# Patient Record
Sex: MALE | Race: WHITE | NOT HISPANIC OR LATINO | Employment: OTHER | URBAN - METROPOLITAN AREA
[De-identification: names, ages, dates, MRNs, and addresses within clinical notes are randomized per-mention and may not be internally consistent; named-entity substitution may affect disease eponyms.]

---

## 2017-05-19 ENCOUNTER — HISTORICAL (OUTPATIENT)
Dept: LAB | Facility: HOSPITAL | Age: 62
End: 2017-05-19

## 2017-05-19 LAB
ALBUMIN SERPL-MCNC: 3.8 GM/DL (ref 3.4–5)
ALBUMIN/GLOB SERPL: 1 RATIO (ref 1.1–2)
ALP SERPL-CCNC: 66 UNIT/L (ref 46–116)
ALT SERPL-CCNC: 30 UNIT/L (ref 12–78)
APPEARANCE, UA: CLEAR
AST SERPL-CCNC: 14 UNIT/L (ref 15–37)
BACTERIA #/AREA URNS AUTO: NORMAL /HPF
BILIRUB SERPL-MCNC: 0.4 MG/DL (ref 0.2–1)
BILIRUB UR QL STRIP: NEGATIVE
BILIRUBIN DIRECT+TOT PNL SERPL-MCNC: 0.11 MG/DL (ref 0–0.2)
BILIRUBIN DIRECT+TOT PNL SERPL-MCNC: 0.29 MG/DL (ref 0–0.8)
BNP BLD-MCNC: 43 PG/ML (ref 0–125)
BUN SERPL-MCNC: 16 MG/DL (ref 7–18)
CALCIUM SERPL-MCNC: 9.4 MG/DL (ref 8.5–10.1)
CHLORIDE SERPL-SCNC: 103 MMOL/L (ref 98–107)
CHOLEST SERPL-MCNC: 180 MG/DL (ref 0–200)
CHOLEST/HDLC SERPL: 6 {RATIO} (ref 0–5)
CO2 SERPL-SCNC: 31.8 MMOL/L (ref 21–32)
COLOR UR: YELLOW
CREAT SERPL-MCNC: 1.05 MG/DL (ref 0.6–1.3)
ERYTHROCYTE [DISTWIDTH] IN BLOOD BY AUTOMATED COUNT: 14.4 % (ref 11.5–17)
GLOBULIN SER-MCNC: 3.7 GM/DL (ref 2.4–3.5)
GLUCOSE (UA): NEGATIVE
GLUCOSE SERPL-MCNC: 91 MG/DL (ref 74–106)
HCT VFR BLD AUTO: 50.1 % (ref 42–52)
HDLC SERPL-MCNC: 30 MG/DL (ref 40–60)
HGB BLD-MCNC: 15.9 GM/DL (ref 14–18)
HGB UR QL STRIP: NEGATIVE
KETONES UR QL STRIP: NEGATIVE
LDLC SERPL CALC-MCNC: 124 MG/DL (ref 0–129)
LEUKOCYTE ESTERASE UR QL STRIP: NEGATIVE
MCH RBC QN AUTO: 30.8 PG (ref 27–31)
MCHC RBC AUTO-ENTMCNC: 31.7 GM/DL (ref 33–36)
MCV RBC AUTO: 97.1 FL (ref 80–94)
NITRITE UR QL STRIP.AUTO: NEGATIVE
PH UR STRIP: 5.5 [PH] (ref 5–9)
PLATELET # BLD AUTO: 163 X10(3)/MCL (ref 130–400)
PMV BLD AUTO: 8.8 FL (ref 7.4–10.4)
POTASSIUM SERPL-SCNC: 5 MMOL/L (ref 3.5–5.1)
PROT SERPL-MCNC: 7.5 GM/DL (ref 6.4–8.2)
PROT UR QL STRIP: NEGATIVE
RBC # BLD AUTO: 5.16 X10(6)/MCL (ref 4.7–6.1)
RBC #/AREA URNS HPF: NORMAL /[HPF]
SODIUM SERPL-SCNC: 142 MMOL/L (ref 136–145)
SP GR UR STRIP: 1.02 (ref 1–1.03)
SQUAMOUS EPITHELIAL, UA: NORMAL
TRIGL SERPL-MCNC: 129 MG/DL
TSH SERPL-ACNC: 0.66 MIU/ML (ref 0.36–3.74)
UROBILINOGEN UR STRIP-ACNC: 0.2
VLDLC SERPL CALC-MCNC: 26 MG/DL
WBC # SPEC AUTO: 10.6 X10(3)/MCL (ref 4.5–11.5)
WBC #/AREA URNS AUTO: NORMAL /[HPF]

## 2018-01-04 ENCOUNTER — HOSPITAL ENCOUNTER (OUTPATIENT)
Dept: MEDSURG UNIT | Facility: HOSPITAL | Age: 63
End: 2018-01-05
Attending: INTERNAL MEDICINE | Admitting: INTERNAL MEDICINE

## 2018-10-05 ENCOUNTER — HISTORICAL (OUTPATIENT)
Dept: LAB | Facility: HOSPITAL | Age: 63
End: 2018-10-05

## 2018-10-05 LAB
ALBUMIN SERPL-MCNC: 3.7 GM/DL (ref 3.4–5)
ALBUMIN/GLOB SERPL: 1.2 RATIO (ref 1.1–2)
ALP SERPL-CCNC: 61 UNIT/L (ref 46–116)
ALT SERPL-CCNC: 29 UNIT/L (ref 12–78)
APPEARANCE, UA: CLEAR
AST SERPL-CCNC: 15 UNIT/L (ref 15–37)
BACTERIA #/AREA URNS AUTO: NORMAL /HPF
BILIRUB SERPL-MCNC: 0.2 MG/DL (ref 0.2–1)
BILIRUB UR QL STRIP: NEGATIVE
BILIRUBIN DIRECT+TOT PNL SERPL-MCNC: 0.09 MG/DL (ref 0–0.2)
BILIRUBIN DIRECT+TOT PNL SERPL-MCNC: 0.11 MG/DL (ref 0–0.8)
BUN SERPL-MCNC: 26.6 MG/DL (ref 7–18)
CALCIUM SERPL-MCNC: 9.2 MG/DL (ref 8.5–10.1)
CHLORIDE SERPL-SCNC: 101 MMOL/L (ref 98–107)
CHOLEST SERPL-MCNC: 171 MG/DL (ref 0–200)
CHOLEST/HDLC SERPL: 6.1 {RATIO} (ref 0–5)
CO2 SERPL-SCNC: 31.6 MMOL/L (ref 21–32)
COLOR UR: YELLOW
CREAT SERPL-MCNC: 1.3 MG/DL (ref 0.6–1.3)
ERYTHROCYTE [DISTWIDTH] IN BLOOD BY AUTOMATED COUNT: 13.6 % (ref 11.5–17)
GLOBULIN SER-MCNC: 3.2 GM/DL (ref 2.4–3.5)
GLUCOSE (UA): NEGATIVE
GLUCOSE SERPL-MCNC: 94 MG/DL (ref 74–106)
HCT VFR BLD AUTO: 46.9 % (ref 42–52)
HDLC SERPL-MCNC: 28 MG/DL (ref 40–60)
HGB BLD-MCNC: 14.8 GM/DL (ref 14–18)
HGB UR QL STRIP: NEGATIVE
KETONES UR QL STRIP: NEGATIVE
LDLC SERPL CALC-MCNC: 113 MG/DL (ref 0–129)
LEUKOCYTE ESTERASE UR QL STRIP: NEGATIVE
MCH RBC QN AUTO: 31.6 PG (ref 27–31)
MCHC RBC AUTO-ENTMCNC: 31.6 GM/DL (ref 33–36)
MCV RBC AUTO: 100.2 FL (ref 80–94)
NITRITE UR QL STRIP.AUTO: NEGATIVE
PH UR STRIP: 6 [PH] (ref 5–9)
PLATELET # BLD AUTO: 153 X10(3)/MCL (ref 130–400)
PMV BLD AUTO: 10.1 FL (ref 9.4–12.4)
POTASSIUM SERPL-SCNC: 5 MMOL/L (ref 3.5–5.1)
PROT SERPL-MCNC: 6.9 GM/DL (ref 6.4–8.2)
PROT UR QL STRIP: NEGATIVE
PSA SERPL-MCNC: 1.26 NG/ML (ref 0–4)
RBC # BLD AUTO: 4.68 X10(6)/MCL (ref 4.7–6.1)
RBC #/AREA URNS HPF: NORMAL /[HPF]
SODIUM SERPL-SCNC: 139 MMOL/L (ref 136–145)
SP GR UR STRIP: 1.01 (ref 1–1.03)
SQUAMOUS EPITHELIAL, UA: NORMAL
TRIGL SERPL-MCNC: 152 MG/DL
TSH SERPL-ACNC: 0.8 MIU/ML (ref 0.36–3.74)
UROBILINOGEN UR STRIP-ACNC: 0.2
VLDLC SERPL CALC-MCNC: 30 MG/DL
WBC # SPEC AUTO: 9.5 X10(3)/MCL (ref 4.5–11.5)
WBC #/AREA URNS AUTO: NORMAL /[HPF]

## 2021-01-04 ENCOUNTER — HISTORICAL (OUTPATIENT)
Dept: LAB | Facility: HOSPITAL | Age: 66
End: 2021-01-04

## 2021-01-04 LAB
ALBUMIN SERPL-MCNC: 3.5 GM/DL (ref 3.4–4.8)
ALBUMIN/GLOB SERPL: 1.2 RATIO (ref 1.1–2)
ALP SERPL-CCNC: 57 UNIT/L (ref 40–150)
ALT SERPL-CCNC: 24 UNIT/L (ref 0–55)
APPEARANCE, UA: CLEAR
AST SERPL-CCNC: 15 UNIT/L (ref 5–34)
BACTERIA #/AREA URNS AUTO: NORMAL /HPF
BILIRUB SERPL-MCNC: 0.4 MG/DL
BILIRUB UR QL STRIP: NEGATIVE
BILIRUBIN DIRECT+TOT PNL SERPL-MCNC: 0.1 MG/DL (ref 0–0.5)
BILIRUBIN DIRECT+TOT PNL SERPL-MCNC: 0.3 MG/DL (ref 0–0.8)
BUN SERPL-MCNC: 16.8 MG/DL (ref 8.4–25.7)
CALCIUM SERPL-MCNC: 8.8 MG/DL (ref 8.8–10)
CHLORIDE SERPL-SCNC: 100 MMOL/L (ref 98–107)
CHOLEST SERPL-MCNC: 170 MG/DL
CHOLEST/HDLC SERPL: 5 {RATIO} (ref 0–5)
CO2 SERPL-SCNC: 32 MMOL/L (ref 23–31)
COLOR UR: YELLOW
CREAT SERPL-MCNC: 0.91 MG/DL (ref 0.73–1.18)
ERYTHROCYTE [DISTWIDTH] IN BLOOD BY AUTOMATED COUNT: 14 % (ref 11.5–17)
GLOBULIN SER-MCNC: 2.9 GM/DL (ref 2.4–3.5)
GLUCOSE (UA): NEGATIVE
GLUCOSE SERPL-MCNC: 91 MG/DL (ref 82–115)
HCT VFR BLD AUTO: 50.4 % (ref 42–52)
HDLC SERPL-MCNC: 36 MG/DL (ref 35–60)
HGB BLD-MCNC: 15 GM/DL (ref 14–18)
HGB UR QL STRIP: NORMAL
KETONES UR QL STRIP: NEGATIVE
LDLC SERPL CALC-MCNC: 115 MG/DL (ref 50–140)
LEUKOCYTE ESTERASE UR QL STRIP: NEGATIVE
MCH RBC QN AUTO: 31 PG (ref 27–31)
MCHC RBC AUTO-ENTMCNC: 29.8 GM/DL (ref 33–36)
MCV RBC AUTO: 104.1 FL (ref 80–94)
NITRITE UR QL STRIP.AUTO: NEGATIVE
PH UR STRIP: 5 [PH] (ref 5–9)
PLATELET # BLD AUTO: 162 X10(3)/MCL (ref 130–400)
PMV BLD AUTO: 10.2 FL (ref 9.4–12.4)
POTASSIUM SERPL-SCNC: 4.2 MMOL/L (ref 3.5–5.1)
PROT SERPL-MCNC: 6.4 GM/DL (ref 5.8–7.6)
PROT UR QL STRIP: NEGATIVE
RBC # BLD AUTO: 4.84 X10(6)/MCL (ref 4.7–6.1)
RBC #/AREA URNS HPF: NORMAL /HPF
SODIUM SERPL-SCNC: 140 MMOL/L (ref 136–145)
SP GR UR STRIP: 1.01 (ref 1–1.03)
SQUAMOUS EPITHELIAL, UA: NORMAL
TRIGL SERPL-MCNC: 94 MG/DL (ref 34–140)
TSH SERPL-ACNC: 0.83 UIU/ML (ref 0.35–4.94)
UROBILINOGEN UR STRIP-ACNC: 0.2
VLDLC SERPL CALC-MCNC: 19 MG/DL
WBC # SPEC AUTO: 8.4 X10(3)/MCL (ref 4.5–11.5)
WBC #/AREA URNS AUTO: NORMAL /[HPF]

## 2022-04-30 NOTE — H&P
Patient:   Justin Fierro            MRN: [id number]            FIN: [id number]               Age:   62 years     Sex:  Male     :  1955   Associated Diagnoses:   None   Author:   Jeff Mota MD      Chief Complaint   2018 8:00 CST        coughing, congested, x 3 days, fever, chills, body aches, hx copd, nebulizer 2 hours ago, inhaler 2 hours ago        History of Present Illness   The patient presents with severe copd by PFT GOLD 3 who presented wth cough, congestion and fever with acute on chronic resp failure worsened by influenza;  pt was tachypneic and hypoxemic; symptoms were mod to severe. pt better with steroids; tamflu started CXR shows no infiltrates.  comoribed factors include CAD and CHF with unkonw EF but presumed to be less than 40% due to recs of needing defibrillator per Dr Cobos; he continues to smoke despite the issues and still wants to be a DNR.        Review of Systems   Constitutional:  Fever, Chills, Sweats, Weakness.    Eye:  No blurring, No double vision.    Ear/Nose/Mouth/Throat:  Nasal congestion, Sore throat.    Respiratory:  Shortness of breath, Cough, Sputum production, Wheezing, No hemoptysis, No cyanosis.    Cardiovascular:  Tachycardia, No chest pain, No palpitations, No bradycardia, No peripheral edema.    Gastrointestinal:  Vomiting, No nausea.    Genitourinary:  No dysuria, No hematuria.    Hematology/Lymphatics:  No bruising tendency, No bleeding tendency.    Endocrine:  No excessive thirst, No polyuria.    Immunologic:  Not immunocompromised, No recurrent fevers.    Musculoskeletal:  No neck pain, No joint pain.    Integumentary:  No rash, No pruritus.    Neurologic:  Alert and oriented X4, No abnormal balance.    Psychiatric:  No anxiety, No depression.    All other systems are negative      Health Status   Current medications:  (Selected)   Inpatient Medications  Ordered  Altace 2.5 mg oral capsule: 10 mg, form: Cap, Oral, BID, first dose 18 21:00:00  CST, 03316  Coreg 12.5 mg oral tablet: 12.5 mg, form: Tab, Oral, qPM, first dose 01/04/18 21:00:00 CST, 44827  Coreg 12.5 mg oral tablet: 25 mg, form: Tab, Oral, Daily, first dose 01/05/18 9:00:00 CST, 23  DuoNeb: 3 mL, form: Soln, NEB, q4hr Resp, first dose 01/04/18 14:00:00 CST  SOLU-Medrol: 80 mg, form: Injection, IV Push, q6hr, first dose 01/04/18 11:00:00 CST, 30,022  Tamiflu 75 mg oral capsule: 75 mg, form: Cap, Oral, BID, Order duration: 5 day(s), first dose 01/04/18 21:00:00 CST, stop date 01/09/18 20:59:00 CST, 24,034  Wellbutrin XL: 150 mg, form: Tab XL, Oral, Daily, first dose 01/05/18 9:00:00 CST, 23  alPRAzolam 0.5 mg oral tablet: 0.5 mg, form: Tab, Oral, Daily PRN for anxiety, first dose 01/04/18 9:51:00 CST, 23  escitalopram 10 mg oral tablet: 10 mg, form: Tab, Oral, Daily, first dose 01/05/18 9:00:00 CST, 23  furosemide 40 mg oral tablet: 40 mg, form: Tab, Oral, BID, first dose 01/04/18 18:00:00 CST, 24,037  tamsulosin 0.4 mg oral capsule: 0.4 mg, form: Cap, Oral, Daily, first dose 01/05/18 9:00:00 CST, 23  Prescriptions  Prescribed  ProAir HFA 90 mcg/inh inhalation aerosol with adapter: 2 puff(s), INH, q4hr, PRN PRN for wheezing, use with spacer chamber, # 1 EA, 0 Refill(s)  Documented Medications  Documented  TAMSULOSIN 0.4MG CAPSULES: 0.4 mg = 1 cap(s), Oral, Daily  alPRAzolam 0.5 mg oral tablet: 0.5 mg = 1 tab(s), Oral, Daily, PRN for anxiety  aspirin 81 mg oral tablet, CHEWABLE: 1 tab, Oral, Daily  buPROPion 150 mg oral tablet, extended release: 1 tab, Oral, Daily  carvedilol 25 mg oral tablet: See Instructions, 1 tab q am and 1/2 tab q pm  escitalopram 10 mg oral tablet: 1 tab, Oral, Daily  furosemide 40 mg oral tablet: 1 tab, Oral, BID  ramipril 10 mg oral tablet: 1 tab, Oral, BID      Histories   Past Medical History:    Resolved  Venous stasis (176708336):  Resolved.  PUD - Peptic ulcer disease (9868923508):  Resolved.  ARTURO - Obstructive sleep apnea (7987344410):  Resolved.  COPD - Chronic  obstructive pulmonary disease (128026156):  Resolved.  Continuous tobacco use (0447089937):  Resolved.  CHF - Congestive heart failure (273722756):  Resolved.  CAD - Coronary artery disease (4983076245):  Resolved.  BPH - Benign prostatic hypertrophy (142966796):  Resolved.  BP+ - Hypertension (691073651):  Resolved.  Adult-onset obesity (197596021):  Resolved.   Family History:    Primary malignant neoplasm of colon  Other  Comments:  1/4/2018 14:58 - Svetlana DUNBAR, Jeff  Uncle  Primary malignant neoplasm of breast  Mother  Primary malignant neoplasm of prostate  Brother     Procedure history:    removal of skin lession to right hand.  Appendectomy (SNOMED CT 758515902).  T&A.  baloon LAD.   Social History        Social & Psychosocial Habits    Alcohol  07/01/2016  Use: Never    Substance Abuse  07/01/2016  Use: Never    Tobacco  07/01/2016  Use: Current every day smoker    Type: Cigarettes    Tobacco use per day: 05    Ready to change: No    Concerns about tobacco use in household: No    01/04/2018  Use: Current every day smoker    Type: Cigarettes    Tobacco use per day: 20  .        Physical Examination   General:  Alert and oriented, Mild distress.    Eye:  Pupils are equal, round and reactive to light, Extraocular movements are intact.    HENT:  Normocephalic, Normal hearing.    Neck:  Supple, Non-tender.    Respiratory:  decreased breath sounds bilaterally with wheezing.    Cardiovascular:  Normal rate, Regular rhythm, No murmur.    Gastrointestinal:  Soft, Non-tender, Non-distended.       Vital Signs (last 24 hrs)_____  Last Charted___________  Temp Oral     37.2 DegC  (JAN 04 11:46)  Heart Rate Peripheral   80 bpm  (JAN 04 11:46)  Resp Rate         22 br/min  (JAN 04 11:00)  SBP      H 146mmHg  (JAN 04 11:46)  DBP      75 mmHg  (JAN 04 11:46)  SpO2      L 93%  (JAN 04 11:46)  Weight      142.7 kg  (JAN 04 11:46)  Height      182.8 cm  (JAN 04 11:46)  BMI      42.7  (JAN 04 11:46)     Genitourinary:  No  costovertebral angle tenderness.    Lymphatics:  No lymphadenopathy neck, axilla, groin.    Musculoskeletal:  Normal range of motion, Normal strength.    Integumentary:  Warm, Dry, bilateral venous stasis changes.       Review / Management   Results review:     No qualifying data available.    Laboratory Results   Last 5 Days Lab Results : PowerNote Discrete Results   1/4/2018 8:19 CST        WBC                       6.7 x10(3)/mcL                             RBC                       4.78 x10(6)/mcL                             Hgb                       15.1 gm/dL                             Hct                       46.6 %                             Platelet                  113 x10(3)/mcL  LOW                             MCV                       97.6 fL  HI                             MCH                       31.5 pg  HI                             MCHC                      32.3 gm/dL  LOW                             RDW                       15.2 %                             MPV                       8.3 fL                             Abs Neut                  4.40 x10(3)/mcL                             Neutro Auto               66 %                             Lymph Auto                19 %                             Mono Auto                 14 %  HI                             Eos Auto                  0 %  NA                             Basophil Auto             1 %                             Abs Neutro                4.40 x10(3)/mcL                             Abs Lymph                 1.2 x10(3)/mcL  NA                             Abs Mono                  0.9 x10(3)/mcL  NA                             Abs Baso                  0.1 x10(3)/mcL  NA                             Sodium Lvl                136 mmol/L                             Potassium Lvl             4.7 mmol/L                             Chloride                  100 mmol/L                             CO2                       33.6  mmol/L  HI                             Calcium Lvl               9.1 mg/dL                             Glucose Lvl               102 mg/dL                             BUN                       18.1 mg/dL  HI                             Creatinine                1.13 mg/dL                             eGFR-AA                   >60 mL/min/1.73 m2  NA                             eGFR-EMERSON                  >60 mL/min/1.73 m2  NA                             Bili Total                0.4 mg/dL                             Bili Direct               0.13 mg/dL                             Bili Indirect             0.30 mg/dL                             AST                       30 unit/L                             ALT                       42 unit/L                             Alk Phos                  60 unit/L                             Total Protein             7.6 gm/dL                             Albumin Lvl               3.50 gm/dL                             Globulin                  4.10 gm/dL  HI                             A/G Ratio                 0.9 ratio  LOW                             BNP                       65.0 pg/mL        Diagnostic Findings:  * Final Report *    Reason For Exam  Congestion    Radiology Report  PORTABLE CHEST X-RAY, ONE FRONTAL VIEW     HISTORY: Congestion     COMPARISON: 3/30/2017     FINDINGS:       No focal consolidations, pleural effusions or pneumothoraces.  Cardiomediastinal silhouette within normal limits.  No acute bony pathology.  Soft tissues within normal limits.       IMPRESSION:     No acute thoracic abnormality.  No significant interval change.       Signature Line  Electronically Signed By: Alex Machado MD  Date/Time Signed: 01/04/2018 08:30      This document has an image    Result type: XR Chest 1 View  Result date: January 04, 2018 8:20 CST  Result status: Auth (Verified)  Result title: XR Chest 1 View  Performed by: Alex Machado MD on January 04, 2018  8:28 CST  Verified by: Alex Machado MD on January 04, 2018 8:30 CST  Encounter info: [id number], [facility] Acute, Emergency, 1/4/2018 - 1/4/2018      .       Impression and Plan   COPD exacerbation with acute on chronic resp failure along with hypoxemia secondary to Inluenza; start tamiflu, steroids and nebs; give patch for tobacco abuse  thromboyctopenia; clinically looks good; no signs of sepsis; hold aspirin  CAD: pt at high risk and is full code; hx LAD sp balloon around 2000; otain records from dr rondon; continues to smoke; denies HLD or DM; restart asa if platelets improve  BPD: cont meds   HTN: Stable cont meds  CHF unknow EF; cont acei, diuretic, bblocker; ordered workup from dr rondon for review; appears stable volume status  DCPlanning in 1-2 days

## 2022-04-30 NOTE — DISCHARGE SUMMARY
Patient:   Justin Fierro            MRN: [id number]            FIN: [id number]               Age:   62 years     Sex:  Male     :  1955   Associated Diagnoses:   None   Author:   Svetlana DUNBAR, Jeff      Chief Complaint   2018 8:00 CST        coughing, congested, x 3 days, fever, chills, body aches, hx copd, nebulizer 2 hours ago, inhaler 2 hours ago        History of Present Illness   The patient presents with severe copd by PFT GOLD 3 who presented wth cough, congestion and fever with acute on chronic resp failure worsened by influenza;  pt was tachypneic and hypoxemic; symptoms were mod to severe. pt better with steroids; tamflu started CXR shows no infiltrates.  comoribed factors include CAD and CHF with unkonw EF but presumed to be less than 40% due to recs of needing defibrillator per Dr Cobos; he continues to smoke despite the issues and still wants to be a DNR.       CC: SOB: much better today; wants to go home. I discussed DC options for this afternoon; He has oxygen and nebulizer at home.  He has been walking without oxygen around hospital; cough improved      Health Status   Current medications:  (Selected)   Inpatient Medications  Ordered  Altace 2.5 mg oral capsule: 10 mg, form: Cap, Oral, BID, first dose 18 21:00:00 CST, 57637  Coreg 12.5 mg oral tablet: 12.5 mg, form: Tab, Oral, qPM, first dose 18 21:00:00 CST, 36935  Coreg 12.5 mg oral tablet: 25 mg, form: Tab, Oral, Daily, first dose 18 9:00:00 CST, 23  DuoNeb: 3 mL, form: Soln, NEB, q4hr Resp, first dose 18 14:00:00 CST  Tamiflu 75 mg oral capsule: 75 mg, form: Cap, Oral, BID, Order duration: 5 day(s), first dose 18 21:00:00 CST, stop date 18 20:59:00 CST, 24,034  Wellbutrin XL: 150 mg, form: Tab XL, Oral, Daily, first dose 18 9:00:00 CST, 23  alPRAzolam 0.5 mg oral tablet: 0.5 mg, form: Tab, Oral, Daily PRN for anxiety, first dose 18 9:51:00 CST, 23  escitalopram 10 mg oral tablet:  10 mg, form: Tab, Oral, Daily, first dose 01/05/18 9:00:00 CST, 23  furosemide 40 mg oral tablet: 40 mg, form: Tab, Oral, BID, first dose 01/04/18 18:00:00 CST, 24,037  prednisONE 10 mg oral tablet: 30 mg, form: Tab, Oral, BID, first dose 01/05/18 11:00:00 CST, 24,034  tamsulosin 0.4 mg oral capsule: 0.4 mg, form: Cap, Oral, Daily, first dose 01/05/18 9:00:00 CST, 23  Prescriptions  Prescribed  DuoNeb 0.5 mg-2.5 mg/3 mL inhalation solution: 3 mL, NEB, q4hr Resp, # 180 mL, 1 Refill(s), Pharmacy: [facility] [id number]  ProAir HFA 90 mcg/inh inhalation aerosol with adapter: 2 puff(s), INH, q4hr, PRN PRN for wheezing, use with spacer chamber, # 1 EA, 0 Refill(s)  Tamiflu 75 mg oral capsule: 75 mg = 1 cap(s), Oral, BID, X 9 day(s), # 18 cap(s), 0 Refill(s), Pharmacy: [facility] [id number]  prednisONE 10 mg oral tablet: See Instructions, 3 tab(s) Oral BIDX 2 days  2 tabs oral BID X 2 days then  1 tab oral BID X 2 days then  1 tab oral daily X 2 days then DC, # 26 tab(s), 0 Refill(s), Pharmacy: [facility] [id number]  Documented Medications  Documented  TAMSULOSIN 0.4MG CAPSULES: 0.4 mg = 1 cap(s), Oral, Daily  alPRAzolam 0.5 mg oral tablet: 0.5 mg = 1 tab(s), Oral, Daily, PRN PRN anxiety, 0 Refill(s)  aspirin 81 mg oral tablet, CHEWABLE: 1 tab, Oral, Daily  buPROPion 150 mg/24 hours (XL) oral tablet, extended release: 150 mg = 1 tab(s), Oral, Daily, 0 Refill(s)  carvedilol 25 mg oral tablet: See Instructions, 1 tab q am and 1/2 tab q pm  escitalopram 10 mg oral tablet: 1 tab, Oral, Daily  furosemide 40 mg oral tablet: 1 tab, Oral, BID  ramipril 10 mg oral tablet: 1 tab, Oral, BID      Physical Examination   General:  Alert and oriented, Mild distress.    Eye:  Pupils are equal, round and reactive to light, Extraocular movements are intact.    HENT:  Normocephalic, Normal hearing.    Neck:  Supple, Non-tender.    Respiratory:  decreased breath sounds bilaterally with rare wheezing.    Cardiovascular:  Normal  rate, Regular rhythm, No murmur.    Gastrointestinal:  Soft, Non-tender, Non-distended.       Vital Signs (last 24 hrs)_____  Last Charted___________  Temp Oral     36.5 DegC  (JAN 05 12:50)  Heart Rate Peripheral   72 bpm  (JAN 05 12:50)  Resp Rate         18 br/min  (JAN 05 11:00)  SBP      95 mmHg  (JAN 05 12:50)  DBP      64 mmHg  (JAN 05 12:50)  SpO2      L 85%  (JAN 05 12:50)  Weight      143.6 kg  (JAN 05 07:00)     Genitourinary:  No costovertebral angle tenderness.    Lymphatics:  No lymphadenopathy neck, axilla, groin.    Musculoskeletal:  Normal range of motion, Normal strength.    Integumentary:  Warm, Dry, bilateral venous stasis changes.       Review / Management   Results review:     No qualifying data available.       Impression and Plan   COPD exacerbation with acute on chronic resp failure along with hypoxemia secondary to Inluenza; start tamiflu, steroids and nebs; give patch for tobacco abuse; 1/5: gave info on smoking cessation; clearly not ready to quit despite discussion. I transitioned him to oral steroids; I would have liked him to stay another day on IV so that I could taper down these but he refuses to stay any further.  He agreed to stay till this afternoon.  I will reeval him this afternoon for DC  Thromboyctopenia; clinically looks good; no signs of sepsis; hold aspirin and repeat labs as outpt if DC and may restart with obsevation if improved. Otherwise defer to PCP for further workup.   CAD: pt at high risk and is full code; hx LAD sp balloon around 2000; otain records from dr rondon; continues to smoke; denies HLD or DM; restart asa if platelets improve  BPH: cont meds   HTN: Stable cont meds  CHF unknow EF; cont acei, diuretic, bblocker; ordered workup from dr rondon for review; appears stable volume status  DCPlanning this afternoon  time spent in in DC process 38 minutes

## 2022-12-05 ENCOUNTER — LAB VISIT (OUTPATIENT)
Dept: LAB | Facility: HOSPITAL | Age: 67
End: 2022-12-05
Attending: FAMILY MEDICINE
Payer: COMMERCIAL

## 2022-12-05 DIAGNOSIS — I10 ESSENTIAL HYPERTENSION, MALIGNANT: ICD-10-CM

## 2022-12-05 DIAGNOSIS — F41.9 ANXIETY: ICD-10-CM

## 2022-12-05 DIAGNOSIS — E78.5 HYPERLIPIDEMIA, UNSPECIFIED HYPERLIPIDEMIA TYPE: ICD-10-CM

## 2022-12-05 DIAGNOSIS — I25.10 CORONARY ATHEROSCLEROSIS OF NATIVE CORONARY ARTERY: ICD-10-CM

## 2022-12-05 DIAGNOSIS — R07.9 CHEST PAIN, UNSPECIFIED TYPE: ICD-10-CM

## 2022-12-05 DIAGNOSIS — N40.0 BENIGN PROSTATIC HYPERPLASIA, UNSPECIFIED WHETHER LOWER URINARY TRACT SYMPTOMS PRESENT: ICD-10-CM

## 2022-12-05 DIAGNOSIS — J44.89 OBSTRUCTIVE CHRONIC BRONCHITIS WITHOUT EXACERBATION: ICD-10-CM

## 2022-12-05 DIAGNOSIS — L98.9 FIBROHISTIOCYTIC PROLIFERATION OF THE SKIN: ICD-10-CM

## 2022-12-05 DIAGNOSIS — G47.30 INSOMNIA WITH SLEEP APNEA: ICD-10-CM

## 2022-12-05 DIAGNOSIS — D69.6 THROMBOCYTOPENIA, UNSPECIFIED: ICD-10-CM

## 2022-12-05 DIAGNOSIS — R35.0 URINARY FREQUENCY: ICD-10-CM

## 2022-12-05 DIAGNOSIS — I50.9 HEART FAILURE, UNSPECIFIED HF CHRONICITY, UNSPECIFIED HEART FAILURE TYPE: ICD-10-CM

## 2022-12-05 DIAGNOSIS — G47.00 INSOMNIA WITH SLEEP APNEA: ICD-10-CM

## 2022-12-05 DIAGNOSIS — J30.9 SPASMODIC RHINORRHEA: Primary | ICD-10-CM

## 2022-12-05 DIAGNOSIS — M54.50 LUMBAGO: ICD-10-CM

## 2022-12-05 DIAGNOSIS — Z00.00 ROUTINE GENERAL MEDICAL EXAMINATION AT A HEALTH CARE FACILITY: ICD-10-CM

## 2022-12-05 LAB
ALBUMIN SERPL-MCNC: 3.4 GM/DL (ref 3.4–4.8)
ALBUMIN/GLOB SERPL: 0.9 RATIO (ref 1.1–2)
ALP SERPL-CCNC: 60 UNIT/L (ref 40–150)
ALT SERPL-CCNC: 16 UNIT/L (ref 0–55)
APPEARANCE UR: ABNORMAL
AST SERPL-CCNC: 14 UNIT/L (ref 5–34)
BACTERIA #/AREA URNS AUTO: ABNORMAL /HPF
BASOPHILS # BLD AUTO: 0.04 X10(3)/MCL (ref 0–0.2)
BASOPHILS NFR BLD AUTO: 0.4 %
BILIRUB UR QL STRIP.AUTO: NEGATIVE MG/DL
BILIRUBIN DIRECT+TOT PNL SERPL-MCNC: 0.7 MG/DL
BUN SERPL-MCNC: 14.9 MG/DL (ref 8.4–25.7)
CALCIUM SERPL-MCNC: 9.6 MG/DL (ref 8.8–10)
CHLORIDE SERPL-SCNC: 100 MMOL/L (ref 98–107)
CHOLEST SERPL-MCNC: 158 MG/DL
CHOLEST/HDLC SERPL: 5 {RATIO} (ref 0–5)
CO2 SERPL-SCNC: 30 MMOL/L (ref 23–31)
COLOR UR AUTO: ABNORMAL
CREAT SERPL-MCNC: 0.94 MG/DL (ref 0.73–1.18)
EOSINOPHIL # BLD AUTO: 0.28 X10(3)/MCL (ref 0–0.9)
EOSINOPHIL NFR BLD AUTO: 2.7 %
ERYTHROCYTE [DISTWIDTH] IN BLOOD BY AUTOMATED COUNT: 14.2 % (ref 11.5–17)
GFR SERPLBLD CREATININE-BSD FMLA CKD-EPI: >60 MLS/MIN/1.73/M2
GLOBULIN SER-MCNC: 4 GM/DL (ref 2.4–3.5)
GLUCOSE SERPL-MCNC: 92 MG/DL (ref 82–115)
GLUCOSE UR QL STRIP.AUTO: NEGATIVE MG/DL
HCT VFR BLD AUTO: 48.1 % (ref 42–52)
HDLC SERPL-MCNC: 31 MG/DL (ref 35–60)
HGB BLD-MCNC: 14.6 GM/DL (ref 14–18)
IMM GRANULOCYTES # BLD AUTO: 0.02 X10(3)/MCL (ref 0–0.04)
IMM GRANULOCYTES NFR BLD AUTO: 0.2 %
KETONES UR QL STRIP.AUTO: NEGATIVE MG/DL
LDLC SERPL CALC-MCNC: 110 MG/DL (ref 50–140)
LEUKOCYTE ESTERASE UR QL STRIP.AUTO: NEGATIVE UNIT/L
LYMPHOCYTES # BLD AUTO: 2.32 X10(3)/MCL (ref 0.6–4.6)
LYMPHOCYTES NFR BLD AUTO: 22.7 %
MCH RBC QN AUTO: 29.8 PG (ref 27–31)
MCHC RBC AUTO-ENTMCNC: 30.4 MG/DL (ref 33–36)
MCV RBC AUTO: 98.2 FL (ref 80–94)
MONOCYTES # BLD AUTO: 0.68 X10(3)/MCL (ref 0.1–1.3)
MONOCYTES NFR BLD AUTO: 6.7 %
NEUTROPHILS # BLD AUTO: 6.9 X10(3)/MCL (ref 2.1–9.2)
NEUTROPHILS NFR BLD AUTO: 67.3 %
NITRITE UR QL STRIP.AUTO: NEGATIVE
PH UR STRIP.AUTO: 6.5 [PH]
PLATELET # BLD AUTO: 154 X10(3)/MCL (ref 130–400)
PMV BLD AUTO: 9.9 FL (ref 7.4–10.4)
POTASSIUM SERPL-SCNC: 5.1 MMOL/L (ref 3.5–5.1)
PROT SERPL-MCNC: 7.4 GM/DL (ref 5.8–7.6)
PROT UR QL STRIP.AUTO: >=300 MG/DL
PSA SERPL-MCNC: 1.01 NG/ML
RBC # BLD AUTO: 4.9 X10(6)/MCL (ref 4.7–6.1)
RBC #/AREA URNS AUTO: ABNORMAL /HPF
RBC UR QL AUTO: NEGATIVE UNIT/L
SODIUM SERPL-SCNC: 138 MMOL/L (ref 136–145)
SP GR UR STRIP.AUTO: 1.02
SQUAMOUS #/AREA URNS AUTO: ABNORMAL /HPF
TRIGL SERPL-MCNC: 86 MG/DL (ref 34–140)
UROBILINOGEN UR STRIP-ACNC: 0.2 MG/DL
VLDLC SERPL CALC-MCNC: 17 MG/DL
WBC # SPEC AUTO: 10.2 X10(3)/MCL (ref 4.5–11.5)
WBC #/AREA URNS AUTO: ABNORMAL /HPF

## 2022-12-05 PROCEDURE — 36415 COLL VENOUS BLD VENIPUNCTURE: CPT

## 2022-12-05 PROCEDURE — 84153 ASSAY OF PSA TOTAL: CPT

## 2022-12-05 PROCEDURE — 80053 COMPREHEN METABOLIC PANEL: CPT

## 2022-12-05 PROCEDURE — 85025 COMPLETE CBC W/AUTO DIFF WBC: CPT

## 2022-12-05 PROCEDURE — 81003 URINALYSIS AUTO W/O SCOPE: CPT

## 2022-12-05 PROCEDURE — 80061 LIPID PANEL: CPT

## 2022-12-05 PROCEDURE — 81001 URINALYSIS AUTO W/SCOPE: CPT

## 2022-12-05 PROCEDURE — 84443 ASSAY THYROID STIM HORMONE: CPT

## 2022-12-08 LAB — TSH SERPL-ACNC: 1.26 UIU/ML (ref 0.35–4.94)

## 2023-01-04 ENCOUNTER — HOSPITAL ENCOUNTER (EMERGENCY)
Facility: HOSPITAL | Age: 68
Discharge: HOME | End: 2023-01-04
Attending: STUDENT IN AN ORGANIZED HEALTH CARE EDUCATION/TRAINING PROGRAM
Payer: MEDICARE

## 2023-01-04 VITALS
SYSTOLIC BLOOD PRESSURE: 120 MMHG | HEART RATE: 77 BPM | DIASTOLIC BLOOD PRESSURE: 66 MMHG | RESPIRATION RATE: 20 BRPM | OXYGEN SATURATION: 90 % | WEIGHT: 280 LBS | TEMPERATURE: 98 F | HEIGHT: 72 IN | BODY MASS INDEX: 37.93 KG/M2

## 2023-01-04 DIAGNOSIS — R06.02 SOB (SHORTNESS OF BREATH): ICD-10-CM

## 2023-01-04 DIAGNOSIS — L72.3 SEBACEOUS CYST: ICD-10-CM

## 2023-01-04 DIAGNOSIS — L03.313 CELLULITIS OF CHEST WALL: ICD-10-CM

## 2023-01-04 DIAGNOSIS — T14.8XXA SKIN ABRASION: Primary | ICD-10-CM

## 2023-01-04 PROCEDURE — 99283 EMERGENCY DEPT VISIT LOW MDM: CPT

## 2023-01-04 PROCEDURE — 25000003 PHARM REV CODE 250: Performed by: STUDENT IN AN ORGANIZED HEALTH CARE EDUCATION/TRAINING PROGRAM

## 2023-01-04 PROCEDURE — 93010 ELECTROCARDIOGRAM REPORT: CPT | Mod: ,,, | Performed by: INTERNAL MEDICINE

## 2023-01-04 PROCEDURE — 93005 ELECTROCARDIOGRAM TRACING: CPT

## 2023-01-04 PROCEDURE — 93010 EKG 12-LEAD: ICD-10-PCS | Mod: ,,, | Performed by: INTERNAL MEDICINE

## 2023-01-04 RX ORDER — DOXYCYCLINE 100 MG/1
100 CAPSULE ORAL
Status: COMPLETED | OUTPATIENT
Start: 2023-01-04 | End: 2023-01-04

## 2023-01-04 RX ORDER — DOXYCYCLINE 100 MG/1
100 CAPSULE ORAL 2 TIMES DAILY
Qty: 20 CAPSULE | Refills: 0 | Status: SHIPPED | OUTPATIENT
Start: 2023-01-04 | End: 2023-01-14

## 2023-01-04 RX ADMIN — DOXYCYCLINE HYCLATE 100 MG: 100 CAPSULE ORAL at 08:01

## 2023-01-04 NOTE — ED PROVIDER NOTES
"Encounter Date: 1/4/2023       History     Chief Complaint   Patient presents with    scrotum bleeding     Noted bleeding to scrotum last night and again this am/ [facility] states bleeding controlled/upon walking to [facility] truck he started with wheezing and sob/ they gave a duoneb and pt has no complaints now/ denies sob     Patient is 67-year-old white gentleman past medical of COPD not, hypertension presented to the ER via EMS due to scrotal bleeding.  Patient states he scratched his scrotum last night due to pruritus and states that it started bleeding at that time.  Patient is on anticoagulation.  Patient states the bleeding stops but he just wanted to have it "looked at. "Patient states minimal blood loss.  Patient quantifies blood loss as "1 tbsp. "Patient denies any fevers, chills, cough, congestion, chest pain, shortness of breath abdominal pain.  Under review of systems patient did state that he has a cyst was mid sternum.  Patient states was excised many years ago but now it is slightly tender to palpation.  Patient states he tried to "pop it" last night some "white sticky stuff came out. " patient denies any other complaints this time.    Review of patient's allergies indicates:   Allergen Reactions    Codeine Rash    Penicillins Nausea And Vomiting     No past medical history on file.  No past surgical history on file.  No family history on file.     Review of Systems   Constitutional:  Negative for chills, fatigue and fever.   HENT:  Negative for congestion, sore throat and trouble swallowing.    Eyes:  Negative for pain and visual disturbance.   Respiratory:  Negative for cough, shortness of breath and wheezing.    Cardiovascular:  Negative for chest pain and palpitations.   Gastrointestinal:  Negative for abdominal pain, blood in stool, constipation, diarrhea, nausea and vomiting.   Genitourinary:  Negative for dysuria and hematuria.   Musculoskeletal:  Negative for back pain and myalgias.   Skin:  Positive " for wound. Negative for rash.   Neurological:  Negative for seizures, syncope and headaches.   Psychiatric/Behavioral:  Negative for confusion. The patient is not nervous/anxious.      Physical Exam     Initial Vitals [01/04/23 0712]   BP Pulse Resp Temp SpO2   120/66 77 20 97.7 °F (36.5 °C) (!) 90 %      MAP       --         Physical Exam    Nursing note and vitals reviewed.  Constitutional: He appears well-developed and well-nourished. No distress.   HENT:   Head: Normocephalic and atraumatic.   Eyes: Conjunctivae and EOM are normal. Right eye exhibits no discharge. Left eye exhibits no discharge. No scleral icterus.   Neck: No tracheal deviation present.   Normal range of motion.  Cardiovascular:  Normal rate, regular rhythm and normal heart sounds.     Exam reveals no gallop and no friction rub.       No murmur heard.  Pulmonary/Chest: Breath sounds normal. No respiratory distress. He has no wheezes. He has no rhonchi. He has no rales.   Abdominal: Abdomen is soft. He exhibits no distension. There is no abdominal tenderness. There is no guarding.   Genitourinary:    Genitourinary Comments: Scrotum: RN present in the room for the entirety of this evaluation.  There is a superficial abrasion to the inferior border of the scrotum.  No active bleeding on exam.  Dermis is intact.  Findings consistent with a scratch kavya.    Chest wall:  There is a 2 cm area of erythema, induration.  Direct pressure applied and white, curdy discharge, malodorous was expelled.  Findings consistent with a sebaceous cyst with mild cellulitis surrounding.     Musculoskeletal:         General: Normal range of motion.      Cervical back: Normal range of motion.     Neurological: He is alert.   Skin: Skin is warm and dry. No rash and no abscess noted. No erythema. No pallor.   Psychiatric: His behavior is normal. Judgment normal.       ED Course   Procedures  Labs Reviewed - No data to display  EKG Readings: (Independently Interpreted)  "  Initial Reading: No STEMI. Rhythm: Normal Sinus Rhythm. Heart Rate: 75. Ectopy: No Ectopy. ST Segments: Normal ST Segments. Axis: Left Axis Deviation.     Imaging Results    None          Medications   doxycycline capsule 100 mg (has no administration in time range)     Medical Decision Making:   Initial Assessment:   Overall well-appearing 67-year-old male SpO2 94% on room air  Differential Diagnosis:   Laceration scrotum, superficial abrasion, abscess, cellulitis, epidermoid cyst  ED Management:  Vital signs stable patient is afebrile   Patient presents to the ER via EMS due to "do not have a car"   Active bleeding stopped and superficial abrasion suspected   Advised patient to keep the area clean and dry until avoid scratching in the future   Findings on chest wall consistent with a sebaceous cyst with mild cellulitis surrounding  Doxycycline given here in the emergency room and sent to the pharmacy   Area thoroughly cleaned in a sterile dressing was applied  Advised patient to seek excision via Dermatology and he agreed  Return precautions discussed and follow up with PCP is recommended                        Clinical Impression:   Final diagnoses:  [T14.8XXA] Skin abrasion (Primary)  [L03.313] Cellulitis of chest wall  [L72.3] Sebaceous cyst        ED Disposition Condition    Discharge Stable          ED Prescriptions       Medication Sig Dispense Start Date End Date Auth. Provider    doxycycline (VIBRAMYCIN) 100 MG Cap Take 1 capsule (100 mg total) by mouth 2 (two) times daily. for 10 days 20 capsule 1/4/2023 1/14/2023 Rigo Núñez MD          Follow-up Information       Follow up With Specialties Details Why Contact Info    [facility] - Emergency Dept Emergency Medicine  If symptoms worsen [address] [facility] [id number] [phone]    Yonas Nova MD Family Medicine Schedule an appointment as soon as possible for a visit   [address] [facility].  [facility] " [id number]  [phone]               Rigo Núñez MD  01/04/23 0758

## 2023-07-20 ENCOUNTER — LAB VISIT (OUTPATIENT)
Dept: LAB | Facility: HOSPITAL | Age: 68
End: 2023-07-20
Attending: FAMILY MEDICINE
Payer: MEDICARE

## 2023-07-20 DIAGNOSIS — J44.89 OBSTRUCTIVE CHRONIC BRONCHITIS WITHOUT EXACERBATION: ICD-10-CM

## 2023-07-20 DIAGNOSIS — E78.5 HYPERLIPIDEMIA, UNSPECIFIED HYPERLIPIDEMIA TYPE: ICD-10-CM

## 2023-07-20 DIAGNOSIS — F41.9 ANXIETY HYPERVENTILATION: ICD-10-CM

## 2023-07-20 DIAGNOSIS — N40.0 BENIGN PROSTATIC HYPERPLASIA, UNSPECIFIED WHETHER LOWER URINARY TRACT SYMPTOMS PRESENT: ICD-10-CM

## 2023-07-20 DIAGNOSIS — G47.30 INSOMNIA WITH SLEEP APNEA: ICD-10-CM

## 2023-07-20 DIAGNOSIS — I25.10 CORONARY ATHEROSCLEROSIS OF NATIVE CORONARY ARTERY: ICD-10-CM

## 2023-07-20 DIAGNOSIS — I50.9 HEART FAILURE, UNSPECIFIED HF CHRONICITY, UNSPECIFIED HEART FAILURE TYPE: ICD-10-CM

## 2023-07-20 DIAGNOSIS — F45.8 ANXIETY HYPERVENTILATION: ICD-10-CM

## 2023-07-20 DIAGNOSIS — M54.50 LUMBAGO: ICD-10-CM

## 2023-07-20 DIAGNOSIS — R07.9 CHEST PAIN, UNSPECIFIED TYPE: ICD-10-CM

## 2023-07-20 DIAGNOSIS — J30.9 SPASMODIC RHINORRHEA: Primary | ICD-10-CM

## 2023-07-20 DIAGNOSIS — I10 ESSENTIAL HYPERTENSION, MALIGNANT: ICD-10-CM

## 2023-07-20 DIAGNOSIS — L98.9 FIBROHISTIOCYTIC PROLIFERATION OF THE SKIN: ICD-10-CM

## 2023-07-20 DIAGNOSIS — G47.00 INSOMNIA WITH SLEEP APNEA: ICD-10-CM

## 2023-07-20 DIAGNOSIS — D69.6 THROMBOCYTOPENIA, UNSPECIFIED: ICD-10-CM

## 2023-07-20 LAB
ALBUMIN SERPL-MCNC: 3.5 G/DL (ref 3.4–4.8)
ALBUMIN/GLOB SERPL: 1 RATIO (ref 1.1–2)
ALP SERPL-CCNC: 61 UNIT/L (ref 40–150)
ALT SERPL-CCNC: 16 UNIT/L (ref 0–55)
AST SERPL-CCNC: 16 UNIT/L (ref 5–34)
BILIRUBIN DIRECT+TOT PNL SERPL-MCNC: 0.5 MG/DL
BUN SERPL-MCNC: 17.2 MG/DL (ref 8.4–25.7)
CALCIUM SERPL-MCNC: 9.4 MG/DL (ref 8.8–10)
CHLORIDE SERPL-SCNC: 101 MMOL/L (ref 98–107)
CO2 SERPL-SCNC: 30 MMOL/L (ref 23–31)
CREAT SERPL-MCNC: 0.85 MG/DL (ref 0.73–1.18)
ERYTHROCYTE [DISTWIDTH] IN BLOOD BY AUTOMATED COUNT: 14 % (ref 11.5–17)
GFR SERPLBLD CREATININE-BSD FMLA CKD-EPI: >60 MLS/MIN/1.73/M2
GLOBULIN SER-MCNC: 3.4 GM/DL (ref 2.4–3.5)
GLUCOSE SERPL-MCNC: 94 MG/DL (ref 82–115)
HCT VFR BLD AUTO: 46.2 % (ref 42–52)
HGB BLD-MCNC: 13.8 G/DL (ref 14–18)
MCH RBC QN AUTO: 29.5 PG (ref 27–31)
MCHC RBC AUTO-ENTMCNC: 29.9 G/DL (ref 33–36)
MCV RBC AUTO: 98.7 FL (ref 80–94)
PLATELET # BLD AUTO: 144 X10(3)/MCL (ref 130–400)
PMV BLD AUTO: 9.9 FL (ref 7.4–10.4)
POTASSIUM SERPL-SCNC: 4.7 MMOL/L (ref 3.5–5.1)
PROT SERPL-MCNC: 6.9 GM/DL (ref 5.8–7.6)
RBC # BLD AUTO: 4.68 X10(6)/MCL (ref 4.7–6.1)
SODIUM SERPL-SCNC: 141 MMOL/L (ref 136–145)
WBC # SPEC AUTO: 8.87 X10(3)/MCL (ref 4.5–11.5)

## 2023-07-20 PROCEDURE — 36415 COLL VENOUS BLD VENIPUNCTURE: CPT

## 2023-07-20 PROCEDURE — 80053 COMPREHEN METABOLIC PANEL: CPT

## 2023-07-20 PROCEDURE — 85027 COMPLETE CBC AUTOMATED: CPT
